# Patient Record
Sex: FEMALE | Race: BLACK OR AFRICAN AMERICAN | Employment: OTHER | URBAN - METROPOLITAN AREA
[De-identification: names, ages, dates, MRNs, and addresses within clinical notes are randomized per-mention and may not be internally consistent; named-entity substitution may affect disease eponyms.]

---

## 2017-10-23 ENCOUNTER — APPOINTMENT (OUTPATIENT)
Dept: CT IMAGING | Age: 82
End: 2017-10-23
Attending: EMERGENCY MEDICINE
Payer: MEDICARE

## 2017-10-23 ENCOUNTER — HOSPITAL ENCOUNTER (EMERGENCY)
Age: 82
Discharge: HOME | End: 2017-10-23
Attending: EMERGENCY MEDICINE
Payer: MEDICARE

## 2017-10-23 VITALS
HEIGHT: 66 IN | DIASTOLIC BLOOD PRESSURE: 81 MMHG | RESPIRATION RATE: 16 BRPM | HEART RATE: 74 BPM | TEMPERATURE: 98 F | WEIGHT: 178 LBS | SYSTOLIC BLOOD PRESSURE: 168 MMHG | OXYGEN SATURATION: 98 % | BODY MASS INDEX: 28.61 KG/M2

## 2017-10-23 DIAGNOSIS — G89.29 CHRONIC NONINTRACTABLE HEADACHE, UNSPECIFIED HEADACHE TYPE: Primary | ICD-10-CM

## 2017-10-23 DIAGNOSIS — R51.9 CHRONIC NONINTRACTABLE HEADACHE, UNSPECIFIED HEADACHE TYPE: Primary | ICD-10-CM

## 2017-10-23 LAB
ANION GAP SERPL CALC-SCNC: 10 MMOL/L (ref 7–16)
BUN SERPL-MCNC: 13 MG/DL (ref 8–23)
CALCIUM SERPL-MCNC: 9 MG/DL (ref 8.3–10.4)
CHLORIDE SERPL-SCNC: 104 MMOL/L (ref 98–107)
CO2 SERPL-SCNC: 27 MMOL/L (ref 21–32)
CREAT SERPL-MCNC: 0.78 MG/DL (ref 0.6–1)
GLUCOSE SERPL-MCNC: 147 MG/DL (ref 65–100)
POTASSIUM SERPL-SCNC: 3.7 MMOL/L (ref 3.5–5.1)
SODIUM SERPL-SCNC: 141 MMOL/L (ref 136–145)
TROPONIN I BLD-MCNC: 0 NG/ML (ref 0.02–0.05)

## 2017-10-23 PROCEDURE — 99285 EMERGENCY DEPT VISIT HI MDM: CPT | Performed by: EMERGENCY MEDICINE

## 2017-10-23 PROCEDURE — 70450 CT HEAD/BRAIN W/O DYE: CPT

## 2017-10-23 PROCEDURE — 84484 ASSAY OF TROPONIN QUANT: CPT

## 2017-10-23 PROCEDURE — 80048 BASIC METABOLIC PNL TOTAL CA: CPT | Performed by: EMERGENCY MEDICINE

## 2017-10-23 PROCEDURE — 74011250637 HC RX REV CODE- 250/637: Performed by: EMERGENCY MEDICINE

## 2017-10-23 PROCEDURE — 93005 ELECTROCARDIOGRAM TRACING: CPT | Performed by: EMERGENCY MEDICINE

## 2017-10-23 RX ORDER — BUTALBITAL, ACETAMINOPHEN AND CAFFEINE 50; 325; 40 MG/1; MG/1; MG/1
1 TABLET ORAL
Status: COMPLETED | OUTPATIENT
Start: 2017-10-23 | End: 2017-10-23

## 2017-10-23 RX ORDER — ATORVASTATIN CALCIUM 40 MG/1
40 TABLET, FILM COATED ORAL DAILY
Qty: 30 TAB | Refills: 0 | Status: SHIPPED | OUTPATIENT
Start: 2017-10-23

## 2017-10-23 RX ORDER — BUTALBITAL, ACETAMINOPHEN AND CAFFEINE 300; 40; 50 MG/1; MG/1; MG/1
1 CAPSULE ORAL
Qty: 15 CAP | Refills: 0 | Status: SHIPPED | OUTPATIENT
Start: 2017-10-23 | End: 2017-10-27

## 2017-10-23 RX ORDER — PHENAZOPYRIDINE HYDROCHLORIDE 200 MG/1
200 TABLET, FILM COATED ORAL 3 TIMES DAILY
Qty: 6 TAB | Refills: 0 | Status: SHIPPED | OUTPATIENT
Start: 2017-10-23 | End: 2017-10-25

## 2017-10-23 RX ORDER — ATORVASTATIN CALCIUM 40 MG/1
40 TABLET, FILM COATED ORAL DAILY
COMMUNITY
End: 2017-10-27

## 2017-10-23 RX ADMIN — BUTALBITAL, ACETAMINOPHEN AND CAFFEINE 1 TABLET: 50; 325; 40 TABLET ORAL at 15:27

## 2017-10-23 NOTE — ED NOTES
Patient resting in bed, lying supine. Patient denies any needs at this time. Patient c/o \"small headache\".

## 2017-10-23 NOTE — ED PROVIDER NOTES
HPI:  80 F, here with headache, dizziness and pain with urination. Mariel migraine. Stated this is typical for migraine headaches. She took some Advil at home. Typically takes Excedrin for her headache but does not have any at home. Bilateral frontal.  There is similar to the past.  She's also had some vague dizziness which she has also had in the past. Has been going on for The past 24 hours. She denies any vision changes. No vision loss. She has not had any weakness tingling, numbness. 2 days of pain with urination and some discomfort in the suprapubic region. Denies any abnormal vaginal bleeding or discharge. ROS  Constitutional: No fever, no chills  Skin: no rash  Eye: No vision changes  ENMT: No sore throat, no congestion  Respiratory: No shortness of breath, no cough  Cardiovascular: No chest pain, no palpitations  Gastrointestinal: No vomiting, no nausea, no diarrhea, no constipation, no rectal bleeding, no abdominal pain  : No dysuria, no hematuria  MSK: No back pain, no muscle pain, no joint pain  Neuro: No headache, no change in mental status, no numbness, no tingling, no weakness  Psych: No anxiety, no depression  Endocrine: No hyperglycemia  All other review of systems positive per history of present illness and the above otherwise negative or noncontributory. Visit Vitals    BP (!) 220/84 (BP 1 Location: Left arm, BP Patient Position: At rest)    Pulse 80    Temp 98.1 °F (36.7 °C)    Resp 18    Ht 5' 6\" (1.676 m)    Wt 80.7 kg (178 lb)    SpO2 97%    BMI 28.73 kg/m2     Past Medical History:   Diagnosis Date    Asthma     Diabetes ([facility] [address]     Hypertension     Ill-defined condition     blockage in carotid artery    Psychiatric disorder     depression     Past Surgical History:   Procedure Laterality Date    HX GYN      D & C     Prior to Admission Medications   Prescriptions Last Dose Informant Patient Reported? Taking?    Liraglutide (VICTOZA) 0.6 mg/0.1 mL (18 mg/3 mL) sub-q pen   Yes No   Si.6 mg by SubCUTAneous route. amLODIPine (NORVASC) 10 mg tablet   Yes No   Sig: Take 10 mg by mouth daily. aspirin delayed-release 81 mg tablet   Yes No   Sig: Take 81 mg by mouth daily. brimonidine-timolol (COMBIGAN) 0.2-0.5 % drop ophthalmic solution   Yes No   Sig: Administer 1 Drop to both eyes every twelve (12) hours. brinzolamide (AZOPT) 1 % ophthalmic suspension   Yes No   Sig: Administer 1 Drop to both eyes three (3) times daily. butalbital-acetaminophen-caffeine (FIORICET) -40 mg per tablet   No No   Sig: Take 1 Tab by mouth every four (4) hours as needed for Headache for up to 20 doses. Max Daily Amount: 6 Tabs. Maximum dose 6 capsules daily   cinnamon bark 500 mg cap   Yes No   Sig: Take 1,000 mg by mouth.   cloNIDine (CATAPRES) 0.1 mg/24 hr patch   Yes No   Si Patch by TransDERmal route every seven (7) days. escitalopram oxalate (LEXAPRO) 5 mg tablet   Yes No   Sig: Take  by mouth daily. indapamide (LOZOL) 1.25 mg tablet   Yes No   Sig: Take  by mouth daily. insulin glargine (LANTUS SOLOSTAR) 100 unit/mL (3 mL) pen   Yes No   Sig: by SubCUTAneous route. metFORMIN (GLUCOPHAGE) 1,000 mg tablet   Yes No   Sig: Take 1,000 mg by mouth two (2) times daily (with meals). omeprazole (PRILOSEC) 20 mg capsule   Yes No   Sig: Take 20 mg by mouth daily. pilocarpine (PILOCAR) 4 % ophthalmic solution   Yes No   Sig: Administer 1 Drop to both eyes four (4) times daily. simvastatin (ZOCOR) 40 mg tablet   Yes No   Sig: Take 40 mg by mouth nightly. valsartan (DIOVAN) 320 mg tablet   Yes No   Sig: Take 320 mg by mouth daily.       Facility-Administered Medications: None         Adult Exam   General: alert, no acute distress  Head: normocephalic, atraumatic  ENT: moist mucous membranes  Neck: supple, non-tender; full range of motion  Cardiovascular: regular rate and rhythm, normal peripheral perfusion, no edema  Respiratory: lungs are clear to auscultation; normal respirations; no wheezing, rales or rhonchi  Gastrointestinal: soft, non-tender; no rebound or guarding, no peritoneal signs, no distension  Back: non-tender, full range of motion  Musculoskeletal: normal range of motion, normal strength, no gross deformities  Neurological: alert and oriented x 4, no gross focal deficits; normal speech. No pronator drift. Normal ambulation. No ataxia   Psychiatric: cooperative; appropriate mood and affect    MDM: she obtained and interpreted by me rate of 58 sinus rhythm normal axis. Normal interval.  There is no GLORIA that would be consistent with acute STEMI. Nonspecific T-wave changes noted laterally  NO old EKG for comparison. She is from [place]    Trop negative   Ct head negative. No hemorrhage. Low susp for dissection, acs, pe at this time. UA negative for acute UTI. Possible cystitis with pain on urination. Will give pyridium for home. Normal kidney function. Fioricet given for headache. Well appearing. Neuro intact. No deficits. Low susp for CVA/TIA. Do not feel admission for MRI is needed. Stable for discharge. Patient started she typically has elevated BP when she is at the doctor. She asked for a refill of Atorvastatin 40 mg daily. WIll give her prescription for 30 days     Ct Head Wo Cont    Result Date: 10/23/2017  Noncontrast head CT Clinical Indication: Acute generalized headache and dizziness with history of hypertension, diabetes, depression, carotid artery stenosis. Technique: Noncontrast axial images were obtained through the brain. Comparison: None available Findings: There is no acute intracranial hemorrhage, hydrocephalus, intra-axial mass, or mass-effect. There are moderate scattered and confluent areas of nonspecific low attenuation involving bilateral periventricular white matter and to a lesser extent basal ganglia and centrum semiovale. Superimposed small or developing infarct cannot be excluded by CT.  There is no CT evidence of acute large artery territorial infarction or abnormal extra-axial fluid collection. The mastoid air cells and paranasal sinuses are clear where imaged. No displaced skull fractures are present. Impression: 1. No evidence of hemorrhage. 2. White matter hypodensities are nonspecific, most often chronic small vessel ischemia. Recent Results (from the past 8 hour(s))   EKG, 12 LEAD, INITIAL    Collection Time: 10/23/17  2:26 PM   Result Value Ref Range    Ventricular Rate 58 BPM    Atrial Rate 58 BPM    P-R Interval 192 ms    QRS Duration 92 ms    Q-T Interval 444 ms    QTC Calculation (Bezet) 435 ms    Calculated P Axis 74 degrees    Calculated R Axis -11 degrees    Calculated T Axis 115 degrees    Diagnosis       !! AGE AND GENDER SPECIFIC ECG ANALYSIS !! Sinus bradycardia  Left ventricular hypertrophy with repolarization abnormality  Cannot rule out Septal infarct , age undetermined  Abnormal ECG  When compared with ECG of 17-NOV-2014 22:11,  T wave inversion now evident in Lateral leads     METABOLIC PANEL, BASIC    Collection Time: 10/23/17  2:51 PM   Result Value Ref Range    Sodium 141 136 - 145 mmol/L    Potassium 3.7 3.5 - 5.1 mmol/L    Chloride 104 98 - 107 mmol/L    CO2 27 21 - 32 mmol/L    Anion gap 10 7 - 16 mmol/L    Glucose 147 (H) 65 - 100 mg/dL    BUN 13 8 - 23 MG/DL    Creatinine 0.78 0.6 - 1.0 MG/DL    GFR est AA >60 >60 ml/min/1.73m2    GFR est non-AA >60 >60 ml/min/1.73m2    Calcium 9.0 8.3 - 10.4 MG/DL   POC TROPONIN-I    Collection Time: 10/23/17  2:54 PM   Result Value Ref Range    Troponin-I (POC) 0 (L) 0.02 - 0.05 ng/ml         Dragon voice recognition software was used to create this note. Although the note has been reviewed and corrected where necessary, additional errors may have been overlooked and remain in the text.

## 2017-10-23 NOTE — ED NOTES
Patient ambulatory to the restroom with steady gait. Patient denies dizziness or lightheadedness while walking.

## 2017-10-23 NOTE — ED TRIAGE NOTES
Pt to er c/o headache and feeling dizzy for 24 hrs. .. sts she has high b/p and took her high b/p medicine today. .. Pt c/o pain when she urinates. ... Pt speech clear. .. Equil . .. No leg weakness, no arm weakness. .. symmetrical face in triage

## 2017-10-23 NOTE — DISCHARGE INSTRUCTIONS

## 2017-10-24 LAB
ATRIAL RATE: 58 BPM
CALCULATED P AXIS, ECG09: 74 DEGREES
CALCULATED R AXIS, ECG10: -11 DEGREES
CALCULATED T AXIS, ECG11: 115 DEGREES
DIAGNOSIS, 93000: NORMAL
P-R INTERVAL, ECG05: 192 MS
Q-T INTERVAL, ECG07: 444 MS
QRS DURATION, ECG06: 92 MS
QTC CALCULATION (BEZET), ECG08: 435 MS
VENTRICULAR RATE, ECG03: 58 BPM

## 2019-01-20 ENCOUNTER — HOSPITAL ENCOUNTER (EMERGENCY)
Age: 84
Discharge: ANOTHER HOSPITAL | End: 2019-01-21
Attending: EMERGENCY MEDICINE
Payer: MEDICARE

## 2019-01-20 ENCOUNTER — APPOINTMENT (OUTPATIENT)
Dept: GENERAL RADIOLOGY | Age: 84
End: 2019-01-20
Attending: EMERGENCY MEDICINE
Payer: MEDICARE

## 2019-01-20 DIAGNOSIS — I20.0 UNSTABLE ANGINA PECTORIS (HCC): Primary | ICD-10-CM

## 2019-01-20 LAB
ALBUMIN SERPL-MCNC: 3.4 G/DL (ref 3.2–4.6)
ALBUMIN/GLOB SERPL: 0.9 {RATIO}
ALP SERPL-CCNC: 74 U/L (ref 50–130)
ALT SERPL-CCNC: 27 U/L (ref 12–65)
ANION GAP SERPL CALC-SCNC: 8 MMOL/L
AST SERPL-CCNC: 20 U/L (ref 15–37)
BASOPHILS # BLD: 0.1 K/UL (ref 0–0.2)
BASOPHILS NFR BLD: 1 % (ref 0–2)
BILIRUB SERPL-MCNC: 0.1 MG/DL (ref 0.2–1.1)
BUN SERPL-MCNC: 17 MG/DL (ref 8–23)
CALCIUM SERPL-MCNC: 8.7 MG/DL (ref 8.3–10.4)
CHLORIDE SERPL-SCNC: 107 MMOL/L (ref 98–107)
CO2 SERPL-SCNC: 26 MMOL/L (ref 21–32)
CREAT SERPL-MCNC: 1.14 MG/DL (ref 0.6–1)
DIFFERENTIAL METHOD BLD: ABNORMAL
EOSINOPHIL # BLD: 0.1 K/UL (ref 0–0.8)
EOSINOPHIL NFR BLD: 1 % (ref 0.5–7.8)
ERYTHROCYTE [DISTWIDTH] IN BLOOD BY AUTOMATED COUNT: 14.3 % (ref 11.9–14.6)
GLOBULIN SER CALC-MCNC: 3.9 G/DL (ref 2.3–3.5)
GLUCOSE SERPL-MCNC: 205 MG/DL (ref 65–100)
HCT VFR BLD AUTO: 29 % (ref 35.8–46.3)
HGB BLD-MCNC: 9 G/DL (ref 11.7–15.4)
IMM GRANULOCYTES # BLD AUTO: 0 K/UL (ref 0–0.5)
IMM GRANULOCYTES NFR BLD AUTO: 0 % (ref 0–5)
LYMPHOCYTES # BLD: 2.7 K/UL (ref 0.5–4.6)
LYMPHOCYTES NFR BLD: 35 % (ref 13–44)
MCH RBC QN AUTO: 25.7 PG (ref 26.1–32.9)
MCHC RBC AUTO-ENTMCNC: 31 G/DL (ref 31.4–35)
MCV RBC AUTO: 82.9 FL (ref 79.6–97.8)
MONOCYTES # BLD: 0.7 K/UL (ref 0.1–1.3)
MONOCYTES NFR BLD: 9 % (ref 4–12)
NEUTS SEG # BLD: 4.2 K/UL (ref 1.7–8.2)
NEUTS SEG NFR BLD: 55 % (ref 43–78)
NRBC # BLD: 0 K/UL (ref 0–0.2)
PLATELET # BLD AUTO: 192 K/UL (ref 150–450)
PMV BLD AUTO: 12.2 FL (ref 9.4–12.3)
POTASSIUM SERPL-SCNC: 4.4 MMOL/L (ref 3.5–5.1)
PROT SERPL-MCNC: 7.3 G/DL
RBC # BLD AUTO: 3.5 M/UL (ref 4.05–5.2)
SODIUM SERPL-SCNC: 141 MMOL/L (ref 136–145)
TROPONIN I BLD-MCNC: 0 NG/ML (ref 0.02–0.05)
WBC # BLD AUTO: 7.8 K/UL (ref 4.3–11.1)

## 2019-01-20 PROCEDURE — 85025 COMPLETE CBC W/AUTO DIFF WBC: CPT

## 2019-01-20 PROCEDURE — 84484 ASSAY OF TROPONIN QUANT: CPT

## 2019-01-20 PROCEDURE — 93005 ELECTROCARDIOGRAM TRACING: CPT | Performed by: EMERGENCY MEDICINE

## 2019-01-20 PROCEDURE — 71045 X-RAY EXAM CHEST 1 VIEW: CPT

## 2019-01-20 PROCEDURE — 80053 COMPREHEN METABOLIC PANEL: CPT

## 2019-01-20 PROCEDURE — 99285 EMERGENCY DEPT VISIT HI MDM: CPT | Performed by: EMERGENCY MEDICINE

## 2019-01-20 RX ORDER — METOPROLOL TARTRATE 5 MG/5ML
5 INJECTION INTRAVENOUS ONCE
Status: CANCELLED | OUTPATIENT
Start: 2019-01-20 | End: 2019-01-20

## 2019-01-20 RX ORDER — HEPARIN SODIUM 5000 [USP'U]/100ML
12-25 INJECTION, SOLUTION INTRAVENOUS
Status: CANCELLED | OUTPATIENT
Start: 2019-01-20

## 2019-01-20 RX ORDER — GUAIFENESIN 100 MG/5ML
162 LIQUID (ML) ORAL
Status: CANCELLED | OUTPATIENT
Start: 2019-01-20 | End: 2019-01-20

## 2019-01-20 RX ORDER — HEPARIN SODIUM 5000 [USP'U]/ML
4000 INJECTION, SOLUTION INTRAVENOUS; SUBCUTANEOUS
Status: CANCELLED | OUTPATIENT
Start: 2019-01-20 | End: 2019-01-20

## 2019-01-21 ENCOUNTER — HOSPITAL ENCOUNTER (OUTPATIENT)
Age: 84
Setting detail: OBSERVATION
Discharge: HOME | End: 2019-01-21
Attending: INTERNAL MEDICINE | Admitting: INTERNAL MEDICINE
Payer: MEDICARE

## 2019-01-21 VITALS
HEART RATE: 53 BPM | DIASTOLIC BLOOD PRESSURE: 70 MMHG | BODY MASS INDEX: 28.21 KG/M2 | HEIGHT: 66 IN | TEMPERATURE: 98.4 F | SYSTOLIC BLOOD PRESSURE: 159 MMHG | WEIGHT: 175.5 LBS | OXYGEN SATURATION: 98 % | RESPIRATION RATE: 17 BRPM

## 2019-01-21 VITALS
WEIGHT: 170 LBS | RESPIRATION RATE: 17 BRPM | HEIGHT: 66 IN | TEMPERATURE: 98 F | HEART RATE: 63 BPM | BODY MASS INDEX: 27.32 KG/M2 | DIASTOLIC BLOOD PRESSURE: 74 MMHG | OXYGEN SATURATION: 99 % | SYSTOLIC BLOOD PRESSURE: 159 MMHG

## 2019-01-21 PROBLEM — R69 ILL-DEFINED CONDITION: Chronic | Status: ACTIVE | Noted: 2019-01-21

## 2019-01-21 PROBLEM — R69 ILL-DEFINED CONDITION: Status: ACTIVE | Noted: 2019-01-21

## 2019-01-21 PROBLEM — K21.9 GERD (GASTROESOPHAGEAL REFLUX DISEASE): Chronic | Status: ACTIVE | Noted: 2019-01-21

## 2019-01-21 PROBLEM — R07.9 CHEST PAIN: Status: ACTIVE | Noted: 2019-01-21

## 2019-01-21 PROBLEM — I10 HYPERTENSION: Chronic | Status: ACTIVE | Noted: 2019-01-21

## 2019-01-21 PROBLEM — E78.5 HLD (HYPERLIPIDEMIA): Chronic | Status: ACTIVE | Noted: 2019-01-21

## 2019-01-21 PROBLEM — E11.9 DIABETES (HCC): Chronic | Status: ACTIVE | Noted: 2019-01-21

## 2019-01-21 LAB
ANION GAP SERPL CALC-SCNC: 8 MMOL/L (ref 7–16)
ATRIAL RATE: 74 BPM
BUN SERPL-MCNC: 16 MG/DL (ref 8–23)
CALCIUM SERPL-MCNC: 8.3 MG/DL (ref 8.3–10.4)
CALCULATED P AXIS, ECG09: 84 DEGREES
CALCULATED R AXIS, ECG10: 23 DEGREES
CALCULATED T AXIS, ECG11: 103 DEGREES
CHLORIDE SERPL-SCNC: 108 MMOL/L (ref 98–107)
CHOLEST SERPL-MCNC: 132 MG/DL
CO2 SERPL-SCNC: 26 MMOL/L (ref 21–32)
CREAT SERPL-MCNC: 0.89 MG/DL (ref 0.6–1)
DIAGNOSIS, 93000: NORMAL
ERYTHROCYTE [DISTWIDTH] IN BLOOD BY AUTOMATED COUNT: 14.3 % (ref 11.9–14.6)
GLUCOSE BLD STRIP.AUTO-MCNC: 122 MG/DL (ref 65–100)
GLUCOSE BLD STRIP.AUTO-MCNC: 124 MG/DL (ref 65–100)
GLUCOSE BLD STRIP.AUTO-MCNC: 94 MG/DL (ref 65–100)
GLUCOSE SERPL-MCNC: 106 MG/DL (ref 65–100)
HCT VFR BLD AUTO: 28.4 % (ref 35.8–46.3)
HDLC SERPL-MCNC: 74 MG/DL (ref 40–60)
HDLC SERPL: 1.8 {RATIO}
HGB BLD-MCNC: 8.6 G/DL (ref 11.7–15.4)
LDLC SERPL CALC-MCNC: 48.4 MG/DL
LIPID PROFILE,FLP: ABNORMAL
MAGNESIUM SERPL-MCNC: 1.6 MG/DL (ref 1.8–2.4)
MCH RBC QN AUTO: 25.3 PG (ref 26.1–32.9)
MCHC RBC AUTO-ENTMCNC: 30.3 G/DL (ref 31.4–35)
MCV RBC AUTO: 83.5 FL (ref 79.6–97.8)
NRBC # BLD: 0 K/UL (ref 0–0.2)
P-R INTERVAL, ECG05: 224 MS
PLATELET # BLD AUTO: 177 K/UL (ref 150–450)
PMV BLD AUTO: 12.8 FL (ref 9.4–12.3)
POTASSIUM SERPL-SCNC: 3.7 MMOL/L (ref 3.5–5.1)
Q-T INTERVAL, ECG07: 372 MS
QRS DURATION, ECG06: 82 MS
QTC CALCULATION (BEZET), ECG08: 412 MS
RBC # BLD AUTO: 3.4 M/UL (ref 4.05–5.2)
SODIUM SERPL-SCNC: 142 MMOL/L (ref 136–145)
TRIGL SERPL-MCNC: 48 MG/DL (ref 35–150)
TROPONIN I SERPL-MCNC: 0.02 NG/ML (ref 0.02–0.05)
TSH SERPL DL<=0.005 MIU/L-ACNC: 0.84 UIU/ML (ref 0.36–3.74)
VENTRICULAR RATE, ECG03: 74 BPM
VLDLC SERPL CALC-MCNC: 9.6 MG/DL (ref 6–23)
WBC # BLD AUTO: 7.3 K/UL (ref 4.3–11.1)

## 2019-01-21 PROCEDURE — 74011250636 HC RX REV CODE- 250/636

## 2019-01-21 PROCEDURE — 84443 ASSAY THYROID STIM HORMONE: CPT

## 2019-01-21 PROCEDURE — 80061 LIPID PANEL: CPT

## 2019-01-21 PROCEDURE — 93306 TTE W/DOPPLER COMPLETE: CPT

## 2019-01-21 PROCEDURE — 93458 L HRT ARTERY/VENTRICLE ANGIO: CPT

## 2019-01-21 PROCEDURE — 99152 MOD SED SAME PHYS/QHP 5/>YRS: CPT

## 2019-01-21 PROCEDURE — 74011250636 HC RX REV CODE- 250/636: Performed by: INTERNAL MEDICINE

## 2019-01-21 PROCEDURE — 74011250636 HC RX REV CODE- 250/636: Performed by: NURSE PRACTITIONER

## 2019-01-21 PROCEDURE — 74011250637 HC RX REV CODE- 250/637: Performed by: PHYSICIAN ASSISTANT

## 2019-01-21 PROCEDURE — 36415 COLL VENOUS BLD VENIPUNCTURE: CPT

## 2019-01-21 PROCEDURE — 84484 ASSAY OF TROPONIN QUANT: CPT

## 2019-01-21 PROCEDURE — 74011000250 HC RX REV CODE- 250: Performed by: NURSE PRACTITIONER

## 2019-01-21 PROCEDURE — 83735 ASSAY OF MAGNESIUM: CPT

## 2019-01-21 PROCEDURE — 99218 HC RM OBSERVATION: CPT

## 2019-01-21 PROCEDURE — 74011636320 HC RX REV CODE- 636/320: Performed by: INTERNAL MEDICINE

## 2019-01-21 PROCEDURE — 85027 COMPLETE CBC AUTOMATED: CPT

## 2019-01-21 PROCEDURE — 74011000250 HC RX REV CODE- 250: Performed by: INTERNAL MEDICINE

## 2019-01-21 PROCEDURE — 96365 THER/PROPH/DIAG IV INF INIT: CPT

## 2019-01-21 PROCEDURE — 96361 HYDRATE IV INFUSION ADD-ON: CPT

## 2019-01-21 PROCEDURE — 82962 GLUCOSE BLOOD TEST: CPT

## 2019-01-21 PROCEDURE — 74011250637 HC RX REV CODE- 250/637: Performed by: NURSE PRACTITIONER

## 2019-01-21 PROCEDURE — 77010033678 HC OXYGEN DAILY

## 2019-01-21 PROCEDURE — 77030020263 HC SOL INJ SOD CL0.9% LFCR 1000ML

## 2019-01-21 PROCEDURE — 80048 BASIC METABOLIC PNL TOTAL CA: CPT

## 2019-01-21 RX ORDER — PILOCARPINE HYDROCHLORIDE 40 MG/ML
1 SOLUTION/ DROPS OPHTHALMIC 4 TIMES DAILY
Status: DISCONTINUED | OUTPATIENT
Start: 2019-01-21 | End: 2019-01-22 | Stop reason: HOSPADM

## 2019-01-21 RX ORDER — NALOXONE HYDROCHLORIDE 0.4 MG/ML
0.4 INJECTION, SOLUTION INTRAMUSCULAR; INTRAVENOUS; SUBCUTANEOUS AS NEEDED
Status: DISCONTINUED | OUTPATIENT
Start: 2019-01-21 | End: 2019-01-22 | Stop reason: HOSPADM

## 2019-01-21 RX ORDER — ONDANSETRON 2 MG/ML
4 INJECTION INTRAMUSCULAR; INTRAVENOUS
Status: DISCONTINUED | OUTPATIENT
Start: 2019-01-21 | End: 2019-01-22 | Stop reason: HOSPADM

## 2019-01-21 RX ORDER — VALSARTAN 320 MG/1
320 TABLET ORAL DAILY
Status: DISCONTINUED | OUTPATIENT
Start: 2019-01-21 | End: 2019-01-22 | Stop reason: HOSPADM

## 2019-01-21 RX ORDER — SODIUM CHLORIDE 0.9 % (FLUSH) 0.9 %
5-40 SYRINGE (ML) INJECTION AS NEEDED
Status: DISCONTINUED | OUTPATIENT
Start: 2019-01-21 | End: 2019-01-22 | Stop reason: HOSPADM

## 2019-01-21 RX ORDER — ACETAMINOPHEN 500 MG
500 TABLET ORAL
Status: DISCONTINUED | OUTPATIENT
Start: 2019-01-21 | End: 2019-01-21 | Stop reason: SDUPTHER

## 2019-01-21 RX ORDER — AMLODIPINE BESYLATE 10 MG/1
10 TABLET ORAL DAILY
Status: DISCONTINUED | OUTPATIENT
Start: 2019-01-21 | End: 2019-01-22 | Stop reason: HOSPADM

## 2019-01-21 RX ORDER — TIMOLOL MALEATE 5 MG/ML
1 SOLUTION/ DROPS OPHTHALMIC 2 TIMES DAILY
Status: DISCONTINUED | OUTPATIENT
Start: 2019-01-21 | End: 2019-01-22 | Stop reason: HOSPADM

## 2019-01-21 RX ORDER — INSULIN GLARGINE 100 [IU]/ML
12 INJECTION, SOLUTION SUBCUTANEOUS
Status: DISCONTINUED | OUTPATIENT
Start: 2019-01-21 | End: 2019-01-22 | Stop reason: HOSPADM

## 2019-01-21 RX ORDER — MIDAZOLAM HYDROCHLORIDE 1 MG/ML
.5-2 INJECTION, SOLUTION INTRAMUSCULAR; INTRAVENOUS
Status: DISCONTINUED | OUTPATIENT
Start: 2019-01-21 | End: 2019-01-22 | Stop reason: HOSPADM

## 2019-01-21 RX ORDER — METOPROLOL SUCCINATE 25 MG/1
25 TABLET, EXTENDED RELEASE ORAL DAILY
Status: DISCONTINUED | OUTPATIENT
Start: 2019-01-21 | End: 2019-01-22 | Stop reason: HOSPADM

## 2019-01-21 RX ORDER — INSULIN LISPRO 100 [IU]/ML
0-10 INJECTION, SOLUTION INTRAVENOUS; SUBCUTANEOUS
Status: DISCONTINUED | OUTPATIENT
Start: 2019-01-21 | End: 2019-01-22 | Stop reason: HOSPADM

## 2019-01-21 RX ORDER — ACETAMINOPHEN 325 MG/1
650 TABLET ORAL
Status: DISCONTINUED | OUTPATIENT
Start: 2019-01-21 | End: 2019-01-22 | Stop reason: HOSPADM

## 2019-01-21 RX ORDER — DORZOLAMIDE HCL 20 MG/ML
1 SOLUTION/ DROPS OPHTHALMIC 3 TIMES DAILY
Status: DISCONTINUED | OUTPATIENT
Start: 2019-01-21 | End: 2019-01-21 | Stop reason: SDUPTHER

## 2019-01-21 RX ORDER — ASPIRIN 81 MG/1
81 TABLET ORAL DAILY
Status: DISCONTINUED | OUTPATIENT
Start: 2019-01-21 | End: 2019-01-22 | Stop reason: HOSPADM

## 2019-01-21 RX ORDER — DORZOLAMIDE HCL 20 MG/ML
1 SOLUTION/ DROPS OPHTHALMIC 3 TIMES DAILY
Status: DISCONTINUED | OUTPATIENT
Start: 2019-01-21 | End: 2019-01-22 | Stop reason: HOSPADM

## 2019-01-21 RX ORDER — PANTOPRAZOLE SODIUM 40 MG/1
40 TABLET, DELAYED RELEASE ORAL
Status: DISCONTINUED | OUTPATIENT
Start: 2019-01-21 | End: 2019-01-22 | Stop reason: HOSPADM

## 2019-01-21 RX ORDER — SODIUM CHLORIDE 0.9 % (FLUSH) 0.9 %
5-40 SYRINGE (ML) INJECTION EVERY 8 HOURS
Status: DISCONTINUED | OUTPATIENT
Start: 2019-01-21 | End: 2019-01-22 | Stop reason: HOSPADM

## 2019-01-21 RX ORDER — SODIUM CHLORIDE 9 MG/ML
75 INJECTION, SOLUTION INTRAVENOUS CONTINUOUS
Status: DISCONTINUED | OUTPATIENT
Start: 2019-01-21 | End: 2019-01-22 | Stop reason: HOSPADM

## 2019-01-21 RX ORDER — TIMOLOL MALEATE 5 MG/ML
1 SOLUTION/ DROPS OPHTHALMIC EVERY 12 HOURS
Status: DISCONTINUED | OUTPATIENT
Start: 2019-01-21 | End: 2019-01-21

## 2019-01-21 RX ORDER — FENTANYL CITRATE 50 UG/ML
25-100 INJECTION, SOLUTION INTRAMUSCULAR; INTRAVENOUS
Status: DISCONTINUED | OUTPATIENT
Start: 2019-01-21 | End: 2019-01-22 | Stop reason: HOSPADM

## 2019-01-21 RX ORDER — LATANOPROST 50 UG/ML
1 SOLUTION/ DROPS OPHTHALMIC
Status: DISCONTINUED | OUTPATIENT
Start: 2019-01-21 | End: 2019-01-22 | Stop reason: HOSPADM

## 2019-01-21 RX ORDER — LATANOPROST 50 UG/ML
1 SOLUTION/ DROPS OPHTHALMIC
Status: DISCONTINUED | OUTPATIENT
Start: 2019-01-21 | End: 2019-01-21

## 2019-01-21 RX ORDER — MAGNESIUM SULFATE HEPTAHYDRATE 40 MG/ML
2 INJECTION, SOLUTION INTRAVENOUS ONCE
Status: COMPLETED | OUTPATIENT
Start: 2019-01-21 | End: 2019-01-21

## 2019-01-21 RX ORDER — BRIMONIDINE TARTRATE 2 MG/ML
1 SOLUTION/ DROPS OPHTHALMIC EVERY 12 HOURS
Status: DISCONTINUED | OUTPATIENT
Start: 2019-01-21 | End: 2019-01-21

## 2019-01-21 RX ORDER — BRIMONIDINE TARTRATE 2 MG/ML
1 SOLUTION/ DROPS OPHTHALMIC EVERY 12 HOURS
Status: DISCONTINUED | OUTPATIENT
Start: 2019-01-21 | End: 2019-01-22 | Stop reason: HOSPADM

## 2019-01-21 RX ORDER — HYDROCODONE BITARTRATE AND ACETAMINOPHEN 5; 325 MG/1; MG/1
1 TABLET ORAL
Status: DISCONTINUED | OUTPATIENT
Start: 2019-01-21 | End: 2019-01-22 | Stop reason: HOSPADM

## 2019-01-21 RX ORDER — GUAIFENESIN 100 MG/5ML
243 LIQUID (ML) ORAL DAILY
Status: COMPLETED | OUTPATIENT
Start: 2019-01-21 | End: 2019-01-21

## 2019-01-21 RX ORDER — PILOCARPINE HYDROCHLORIDE 40 MG/ML
1 SOLUTION/ DROPS OPHTHALMIC 4 TIMES DAILY
Status: DISCONTINUED | OUTPATIENT
Start: 2019-01-21 | End: 2019-01-21

## 2019-01-21 RX ORDER — HEPARIN SODIUM 200 [USP'U]/100ML
2 INJECTION, SOLUTION INTRAVENOUS CONTINUOUS
Status: ACTIVE | OUTPATIENT
Start: 2019-01-21 | End: 2019-01-21

## 2019-01-21 RX ORDER — ATORVASTATIN CALCIUM 40 MG/1
40 TABLET, FILM COATED ORAL DAILY
Status: DISCONTINUED | OUTPATIENT
Start: 2019-01-21 | End: 2019-01-22 | Stop reason: HOSPADM

## 2019-01-21 RX ORDER — LIDOCAINE HYDROCHLORIDE 10 MG/ML
2-10 INJECTION INFILTRATION; PERINEURAL
Status: DISCONTINUED | OUTPATIENT
Start: 2019-01-21 | End: 2019-01-22 | Stop reason: HOSPADM

## 2019-01-21 RX ORDER — MORPHINE SULFATE 2 MG/ML
2 INJECTION, SOLUTION INTRAMUSCULAR; INTRAVENOUS
Status: DISCONTINUED | OUTPATIENT
Start: 2019-01-21 | End: 2019-01-21 | Stop reason: SDUPTHER

## 2019-01-21 RX ORDER — NITROGLYCERIN 0.4 MG/1
0.4 TABLET SUBLINGUAL
Status: DISCONTINUED | OUTPATIENT
Start: 2019-01-21 | End: 2019-01-22 | Stop reason: HOSPADM

## 2019-01-21 RX ORDER — MORPHINE SULFATE 2 MG/ML
1 INJECTION, SOLUTION INTRAMUSCULAR; INTRAVENOUS
Status: DISCONTINUED | OUTPATIENT
Start: 2019-01-21 | End: 2019-01-22 | Stop reason: HOSPADM

## 2019-01-21 RX ADMIN — MAGNESIUM SULFATE HEPTAHYDRATE 2 G: 40 INJECTION, SOLUTION INTRAVENOUS at 11:33

## 2019-01-21 RX ADMIN — HEPARIN SODIUM 2 ML/HR: 5000 INJECTION, SOLUTION INTRAVENOUS; SUBCUTANEOUS at 15:15

## 2019-01-21 RX ADMIN — HEPARIN SODIUM 2 ML: 10000 INJECTION, SOLUTION INTRAVENOUS; SUBCUTANEOUS at 15:22

## 2019-01-21 RX ADMIN — SODIUM CHLORIDE 75 ML/HR: 900 INJECTION, SOLUTION INTRAVENOUS at 18:00

## 2019-01-21 RX ADMIN — AMLODIPINE BESYLATE 10 MG: 10 TABLET ORAL at 09:05

## 2019-01-21 RX ADMIN — Medication 5 ML: at 16:54

## 2019-01-21 RX ADMIN — IOPAMIDOL 70 ML: 755 INJECTION, SOLUTION INTRAVENOUS at 15:32

## 2019-01-21 RX ADMIN — TIMOLOL MALEATE 1 DROP: 5 SOLUTION OPHTHALMIC at 10:15

## 2019-01-21 RX ADMIN — BRIMONIDINE TARTRATE 1 DROP: 2 SOLUTION OPHTHALMIC at 10:14

## 2019-01-21 RX ADMIN — Medication 10 ML: at 06:03

## 2019-01-21 RX ADMIN — SODIUM CHLORIDE 75 ML/HR: 900 INJECTION, SOLUTION INTRAVENOUS at 02:13

## 2019-01-21 RX ADMIN — ASPIRIN 81 MG: 81 TABLET, COATED ORAL at 09:04

## 2019-01-21 RX ADMIN — VALSARTAN 320 MG: 320 TABLET, FILM COATED ORAL at 09:04

## 2019-01-21 RX ADMIN — Medication 5 ML: at 02:17

## 2019-01-21 RX ADMIN — NITROGLYCERIN 1 INCH: 20 OINTMENT TOPICAL at 02:16

## 2019-01-21 RX ADMIN — MIDAZOLAM HYDROCHLORIDE 2 MG: 1 INJECTION, SOLUTION INTRAMUSCULAR; INTRAVENOUS at 15:21

## 2019-01-21 RX ADMIN — ATORVASTATIN CALCIUM 40 MG: 40 TABLET, FILM COATED ORAL at 09:04

## 2019-01-21 RX ADMIN — LIDOCAINE HYDROCHLORIDE 2 ML: 10 INJECTION, SOLUTION INFILTRATION; PERINEURAL at 15:21

## 2019-01-21 RX ADMIN — ASPIRIN 81 MG 243 MG: 81 TABLET ORAL at 10:13

## 2019-01-21 RX ADMIN — DORZOLAMIDE HYDROCHLORIDE 1 DROP: 20 SOLUTION/ DROPS OPHTHALMIC at 10:14

## 2019-01-21 RX ADMIN — FENTANYL CITRATE 50 MCG: 50 INJECTION, SOLUTION INTRAMUSCULAR; INTRAVENOUS at 15:21

## 2019-01-21 RX ADMIN — Medication 5 ML: at 13:36

## 2019-01-21 RX ADMIN — NITROGLYCERIN 1 INCH: 20 OINTMENT TOPICAL at 09:08

## 2019-01-21 RX ADMIN — METOPROLOL SUCCINATE 25 MG: 25 TABLET, EXTENDED RELEASE ORAL at 09:08

## 2019-01-21 RX ADMIN — PANTOPRAZOLE SODIUM 40 MG: 40 TABLET, DELAYED RELEASE ORAL at 09:04

## 2019-01-21 RX ADMIN — NITROGLYCERIN 1 INCH: 20 OINTMENT TOPICAL at 13:34

## 2019-01-21 NOTE — ED PROVIDER NOTES
71-year-old female presents with crescendo chest pain onset about a week ago. Initially went to an urgent care who then sent her to [facility] patient failed a stress test there with a reversible ischemic nuclear scans suggestive of perhaps even three-vessel disease. .  Subsequent lysine and her cardiologist office a day or 2 later and has a heart catheter planned for Thursday of next week, 4 days away. Patient experiencing crescendo chest pain. Last night she had 20 minutes of chest pain requiring 2 nitroglycerin relief. Tonight her chest pressure required 3 nitroglycerin to go away. No dyspnea, no nausea, no radiation of the pain, no  Diaphoresis. No previous cardiac history, however this 71-year-old female is diabetic. Currently under a fair amount of stress with her  in the [facility] slated to return home in 10 days, She also has a 71-year-old grandson living at the house with her, who may also be some source of social stressor for her. Past Medical History:  
Diagnosis Date  Asthma  Diabetes ([facility] [address]  Hypertension  Ill-defined condition   
 blockage in carotid artery  Psychiatric disorder   
 depression Past Surgical History:  
Procedure Laterality Date  HX GYN    
 D & C Family History:  
Problem Relation Age of Onset  Diabetes Mother  Hypertension Mother Social History Socioeconomic History  Marital status: UNKNOWN Spouse name: Not on file  Number of children: Not on file  Years of education: Not on file  Highest education level: Not on file Social Needs  Financial resource strain: Not on file  Food insecurity - worry: Not on file  Food insecurity - inability: Not on file  Transportation needs - medical: Not on file  Transportation needs - non-medical: Not on file Occupational History  Not on file Tobacco Use  Smoking status: Never Smoker  Smokeless tobacco: Never Used Substance and Sexual Activity  Alcohol use: Yes Comment: extremely rare  Drug use: No  
 Sexual activity: Not on file Other Topics Concern  Not on file Social History Narrative  Not on file ALLERGIES: Patient has no known allergies. Review of Systems Constitutional: Negative for chills and fever. HENT: Negative for rhinorrhea and sore throat. Eyes: Negative for discharge and redness. Respiratory: Negative for cough and shortness of breath. Cardiovascular: Positive for chest pain. Negative for palpitations. Gastrointestinal: Negative for abdominal pain, diarrhea, nausea and vomiting. Musculoskeletal: Negative for arthralgias and back pain. Skin: Negative for rash. Neurological: Negative for dizziness and headaches. All other systems reviewed and are negative. Vitals:  
 01/20/19 2129 01/20/19 2131 01/20/19 2135 BP: 193/73  193/73 Pulse:  75 68 Resp:   17 Temp:   98.1 °F (36.7 °C) SpO2:  100% 100% Physical Exam  
Constitutional: She is oriented to person, place, and time. She appears well-developed and well-nourished. HENT:  
Head: Normocephalic and atraumatic. Eyes: Conjunctivae are normal. Pupils are equal, round, and reactive to light. Right eye exhibits no discharge. Left eye exhibits no discharge. No scleral icterus. Neck: Normal range of motion. Neck supple. Cardiovascular: Normal rate and regular rhythm. Exam reveals no gallop. Murmur heard. Pulmonary/Chest: Effort normal and breath sounds normal. No respiratory distress. She has no wheezes. She has no rales. Abdominal: Soft. Bowel sounds are normal. There is no tenderness. There is no guarding. Musculoskeletal: Normal range of motion. She exhibits no edema. Neurological: She is alert and oriented to person, place, and time. She exhibits normal muscle tone. cni 2-12 grossly Skin: Skin is warm and dry. She is not diaphoretic. Psychiatric: She has a normal mood and affect. Her behavior is normal.  
Nursing note and vitals reviewed. MDM Number of Diagnoses or Management Options Unstable angina pectoris [facility]): new and requires workup Diagnosis management comments: Medical decision making note: 
Chest pain crescendo Positive reversible ischemic stress test inferolaterally with a TD I score of about 1.27. Suggesting three-vessel disease Nondiagnostic EKG, initial troponin is negative, Unstable angina, will start on heparin and admitted to cardiology with transfer to the other [address] [place] [address] This concludes the \"medical decision making note\" part of this emergency department visit note. Amount and/or Complexity of Data Reviewed Clinical lab tests: reviewed and ordered (Results Include: 
 
Recent Results (from the past 24 hour(s)) -CBC WITH AUTOMATED DIFF Collection Time: 01/20/19  9:48 PM 
     Result                      Value             Ref Range WBC                         7.8               4.3 - 11.1 K* 
     RBC                         3.50 (L)          4.05 - 5.2 M* HGB                         9.0 (L)           11.7 - 15.4 * HCT                         29.0 (L)          35.8 - 46.3 % MCV                         82.9              79.6 - 97.8 * MCH                         25.7 (L)          26.1 - 32.9 * MCHC                        31.0 (L)          31.4 - 35.0 * RDW                         14.3              11.9 - 14.6 % PLATELET                    192               150 - 450 K/* MPV                         12.2              9.4 - 12.3 FL ABSOLUTE NRBC               0.00              0.0 - 0.2 K/* DF                          AUTOMATED NEUTROPHILS                 55                43 - 78 % LYMPHOCYTES                 35                13 - 44 % MONOCYTES                   9                 4.0 - 12.0 % EOSINOPHILS                 1                 0.5 - 7.8 % BASOPHILS                   1                 0.0 - 2.0 % IMMATURE GRANULOCYTES       0                 0.0 - 5.0 %   
     ABS. NEUTROPHILS            4.2               1.7 - 8.2 K/* ABS. LYMPHOCYTES            2.7               0.5 - 4.6 K/* ABS. MONOCYTES              0.7               0.1 - 1.3 K/* ABS. EOSINOPHILS            0.1               0.0 - 0.8 K/* ABS. BASOPHILS              0.1               0.0 - 0.2 K/* ABS. IMM. GRANS.            0.0               0.0 - 0.5 K/* 
-METABOLIC PANEL, COMPREHENSIVE Collection Time: 01/20/19  9:48 PM 
     Result                      Value             Ref Range Sodium                      141               136 - 145 mm* Potassium                   4.4               3.5 - 5.1 mm* Chloride                    107               98 - 107 mmo* CO2                         26                21 - 32 mmol* Anion gap                   8                 mmol/L Glucose                     205 (H)           65 - 100 mg/* BUN                         17                8 - 23 MG/DL Creatinine                  1.14 (H)          0.6 - 1.0 MG* 
     GFR est AA                  58 (L)            >60 ml/min/1* GFR est non-AA              48                ml/min/1.73m2 Calcium                     8.7               8.3 - 10.4 M* Bilirubin, total            0.1 (L)           0.2 - 1.1 MG* ALT (SGPT)                  27                12 - 65 U/L   
     AST (SGOT)                  20                15 - 37 U/L Alk. phosphatase            74                50 - 130 U/L Protein, total              7.3               g/dL      Albumin                     3.4               3.2 - 4.6 g/* 
 Globulin                    3.9 (H)           2.3 - 3.5 g/* A-G Ratio                   0.9 -POC TROPONIN-I Collection Time: 01/20/19  9:53 PM 
     Result                      Value             Ref Range Troponin-I (POC)            0 (L)             0.02 - 0.05 * 
) Tests in the radiology section of CPT®: reviewed and ordered (XR CHEST PORT Final Result IMPRESSION: 
   
  No evidence of acute pulmonary disease. ) Decide to obtain previous medical records or to obtain history from someone other than the patient: yes (abnormal nuclear perfusion study with inferolateral ischemia and TID of 1.27 with preserved LVEF. DM uncontrolled with A1C 8.2.  ) Risk of Complications, Morbidity, and/or Mortality Presenting problems: high Diagnostic procedures: high Management options: high General comments: 31 minutes were spent in the care, evaluation,, management of this critically ill patient with unstable angina pectoris. She was started on heparin, and transferred to the cardiology unit at [address] [place] [address] Critical Care Total time providing critical care: 30-74 minutes Procedures

## 2019-01-21 NOTE — PROGRESS NOTES
Patient heart rate dropping in the 40's asymptomatic, flipping into a second degree heart block. Spoke with Eugene Anne NP. No new orders. Will continue to monitor.

## 2019-01-21 NOTE — PROGRESS NOTES
TRANSFER - IN REPORT: 
 
Verbal report received from [facility](name) on Raqule Osborne  being received from [facility] (unit) for routine progression of care Report consisted of patients Situation, Background, Assessment and  
Recommendations(SBAR). Information from the following report(s) SBAR, Kardex, [facility] and Recent Results was reviewed with the receiving nurse. Opportunity for questions and clarification was provided. Assessment completed upon patients arrival to unit and care assumed.

## 2019-01-21 NOTE — DISCHARGE INSTRUCTIONS
Cardiac Catheterization/Angiography Discharge Instructions    *Check the puncture site frequently for swelling or bleeding. If you see any bleeding, lie down and apply pressure over the area with a clean town or washcloth. Notify your doctor for any redness, swelling, drainage or oozing from the puncture site. Notify your doctor for any fever or chills. *If the leg or arm with the puncture becomes cold, numb or painful, call Dr Yazan Chan at  [phone]. *Activity should be limited for the next 48 hours. Climb stairs as little as possible and avoid any stooping, bending or strenuous activity for 48 hours. No heavy lifting (anything over 10 pounds) for three days. *Do not drive for 48 hours. *You may resume your usual diet. Drink more fluids than usual.    *Have a responsible person drive you home and stay with you for at least 24 hours after your heart catheterization/angiography. *You may remove the bandage from your Right and Arm in 24 hours. You may shower in 24 hours. No tub baths, hot tubs or swimming for one week. Do not place any lotions, creams, powders, ointments over the puncture site for one week. You may place a clean band-aid over the puncture site each day for 5 days. Change this daily. DASH Diet: Care Instructions  Your Care Instructions    The DASH diet is an eating plan that can help lower your blood pressure. DASH stands for Dietary Approaches to Stop Hypertension. Hypertension is high blood pressure. The DASH diet focuses on eating foods that are high in calcium, potassium, and magnesium. These nutrients can lower blood pressure. The foods that are highest in these nutrients are fruits, vegetables, low-fat dairy products, nuts, seeds, and legumes. But taking calcium, potassium, and magnesium supplements instead of eating foods that are high in those nutrients does not have the same effect. The DASH diet also includes whole grains, fish, and poultry.   The DASH diet is one of several lifestyle changes your doctor may recommend to lower your high blood pressure. Your doctor may also want you to decrease the amount of sodium in your diet. Lowering sodium while following the DASH diet can lower blood pressure even further than just the DASH diet alone. Follow-up care is a key part of your treatment and safety. Be sure to make and go to all appointments, and call your doctor if you are having problems. It's also a good idea to know your test results and keep a list of the medicines you take. How can you care for yourself at home? Following the DASH diet  · Eat 4 to 5 servings of fruit each day. A serving is 1 medium-sized piece of fruit, ½ cup chopped or canned fruit, 1/4 cup dried fruit, or 4 ounces (½ cup) of fruit juice. Choose fruit more often than fruit juice. · Eat 4 to 5 servings of vegetables each day. A serving is 1 cup of lettuce or raw leafy vegetables, ½ cup of chopped or cooked vegetables, or 4 ounces (½ cup) of vegetable juice. Choose vegetables more often than vegetable juice. · Get 2 to 3 servings of low-fat and fat-free dairy each day. A serving is 8 ounces of milk, 1 cup of yogurt, or 1 ½ ounces of cheese. · Eat 6 to 8 servings of grains each day. A serving is 1 slice of bread, 1 ounce of dry cereal, or ½ cup of cooked rice, pasta, or cooked cereal. Try to choose whole-grain products as much as possible. · Limit lean meat, poultry, and fish to 2 servings each day. A serving is 3 ounces, about the size of a deck of cards. · Eat 4 to 5 servings of nuts, seeds, and legumes (cooked dried beans, lentils, and split peas) each week. A serving is 1/3 cup of nuts, 2 tablespoons of seeds, or ½ cup of cooked beans or peas. · Limit fats and oils to 2 to 3 servings each day. A serving is 1 teaspoon of vegetable oil or 2 tablespoons of salad dressing. · Limit sweets and added sugars to 5 servings or less a week.  A serving is 1 tablespoon jelly or jam, ½ cup sorbet, or 1 cup of lemonade. · Eat less than 2,300 milligrams (mg) of sodium a day. If you limit your sodium to 1,500 mg a day, you can lower your blood pressure even more. Tips for success  · Start small. Do not try to make dramatic changes to your diet all at once. You might feel that you are missing out on your favorite foods and then be more likely to not follow the plan. Make small changes, and stick with them. Once those changes become habit, add a few more changes. · Try some of the following:  ? Make it a goal to eat a fruit or vegetable at every meal and at snacks. This will make it easy to get the recommended amount of fruits and vegetables each day. ? Try yogurt topped with fruit and nuts for a snack or healthy dessert. ? Add lettuce, tomato, cucumber, and onion to sandwiches. ? Combine a ready-made pizza crust with low-fat mozzarella cheese and lots of vegetable toppings. Try using tomatoes, squash, spinach, broccoli, carrots, cauliflower, and onions. ? Have a variety of cut-up vegetables with a low-fat dip as an appetizer instead of chips and dip. ? Sprinkle sunflower seeds or chopped almonds over salads. Or try adding chopped walnuts or almonds to cooked vegetables. ? Try some vegetarian meals using beans and peas. Add garbanzo or kidney beans to salads. Make burritos and tacos with mashed serna beans or black beans. Where can you learn more? Go to http://arnold-lynsey.info/. Enter X534 in the search box to learn more about \"DASH Diet: Care Instructions. \"  Current as of: July 22, 2018  Content Version: 11.9  © 4200-3683 [employer], [employer]. Care instructions adapted under license by [employer] (which disclaims liability or warranty for this information). If you have questions about a medical condition or this instruction, always ask your healthcare professional. [address] any warranty or liability for your use of this information.        High Blood Pressure: Care Instructions  Overview    It's normal for blood pressure to go up and down throughout the day. But if it stays up, you have high blood pressure. Another name for high blood pressure is hypertension. Despite what a lot of people think, high blood pressure usually doesn't cause headaches or make you feel dizzy or lightheaded. It usually has no symptoms. But it does increase your risk of stroke, heart attack, and other problems. You and your doctor will talk about your risks of these problems based on your blood pressure. Your doctor will give you a goal for your blood pressure. Your goal will be based on your health and your age. Lifestyle changes, such as eating healthy and being active, are always important to help lower blood pressure. You might also take medicine to reach your blood pressure goal.  Follow-up care is a key part of your treatment and safety. Be sure to make and go to all appointments, and call your doctor if you are having problems. It's also a good idea to know your test results and keep a list of the medicines you take. How can you care for yourself at home? Medical treatment  · If you stop taking your medicine, your blood pressure will go back up. You may take one or more types of medicine to lower your blood pressure. Be safe with medicines. Take your medicine exactly as prescribed. Call your doctor if you think you are having a problem with your medicine. · Talk to your doctor before you start taking aspirin every day. Aspirin can help certain people lower their risk of a heart attack or stroke. But taking aspirin isn't right for everyone, because it can cause serious bleeding. · See your doctor regularly. You may need to see the doctor more often at first or until your blood pressure comes down. · If you are taking blood pressure medicine, talk to your doctor before you take decongestants or anti-inflammatory medicine, such as ibuprofen.  Some of these medicines can raise blood pressure. · Learn how to check your blood pressure at home. Lifestyle changes  · Stay at a healthy weight. This is especially important if you put on weight around the waist. Losing even 10 pounds can help you lower your blood pressure. · If your doctor recommends it, get more exercise. Walking is a good choice. Bit by bit, increase the amount you walk every day. Try for at least 30 minutes on most days of the week. You also may want to swim, bike, or do other activities. · Avoid or limit alcohol. Talk to your doctor about whether you can drink any alcohol. · Try to limit how much sodium you eat to less than 2,300 milligrams (mg) a day. Your doctor may ask you to try to eat less than 1,500 mg a day. · Eat plenty of fruits (such as bananas and oranges), vegetables, legumes, whole grains, and low-fat dairy products. · Lower the amount of saturated fat in your diet. Saturated fat is found in animal products such as milk, cheese, and meat. Limiting these foods may help you lose weight and also lower your risk for heart disease. · Do not smoke. Smoking increases your risk for heart attack and stroke. If you need help quitting, talk to your doctor about stop-smoking programs and medicines. These can increase your chances of quitting for good. When should you call for help? Call 911 anytime you think you may need emergency care. This may mean having symptoms that suggest that your blood pressure is causing a serious heart or blood vessel problem. Your blood pressure may be over 180/120.   For example, call 911 if:    · You have symptoms of a heart attack. These may include:  ? Chest pain or pressure, or a strange feeling in the chest.  ? Sweating. ? Shortness of breath. ? Nausea or vomiting. ? Pain, pressure, or a strange feeling in the back, neck, jaw, or upper belly or in one or both shoulders or arms. ? Lightheadedness or sudden weakness.   ? A fast or irregular heartbeat.     · You have symptoms of a stroke. These may include:  ? Sudden numbness, tingling, weakness, or loss of movement in your face, arm, or leg, especially on only one side of your body. ? Sudden vision changes. ? Sudden trouble speaking. ? Sudden confusion or trouble understanding simple statements. ? Sudden problems with walking or balance. ? A sudden, severe headache that is different from past headaches.     · You have severe back or belly pain.    Do not wait until your blood pressure comes down on its own. Get help right away.   Call your doctor now or seek immediate care if:    · Your blood pressure is much higher than normal (such as 180/120 or higher), but you don't have symptoms.     · You think high blood pressure is causing symptoms, such as:  ? Severe headache.  ? Blurry vision.    Watch closely for changes in your health, and be sure to contact your doctor if:    · Your blood pressure measures higher than your doctor recommends at least 2 times. That means the top number is higher or the bottom number is higher, or both.     · You think you may be having side effects from your blood pressure medicine. Where can you learn more? Go to http://arnold-lynsey.info/. Enter T277 in the search box to learn more about \"High Blood Pressure: Care Instructions. \"  Current as of: July 22, 2018  Content Version: 11.9  © 2848-4613 [employer]. Care instructions adapted under license by [employer] (which disclaims liability or warranty for this information). If you have questions about a medical condition or this instruction, always ask your healthcare professional. [place] [address] any warranty or liability for your use of this information.

## 2019-01-21 NOTE — PROCEDURES
Brief Cardiac Procedure Note    Patient: Army Renteria MRN: [id number]  SSN: [id number]    YOB: 1933  Age: 80 y.o. Sex: female      Date of Procedure: 1/21/2019     Pre-procedure Diagnosis: Atypical Angina    Post-procedure Diagnosis: Non-cardiac Chest Pain    Reason for Procedure: ACS < or = 24 Hours    Procedure: Left Heart Catheterization    Brief Description of Procedure: LHC via R radial artery    Performed By: Chauncey Moreira MD     Assistants: None    Anesthesia: Moderate Sedation    Estimated Blood Loss: Less than 10 mL      Specimens: None    Implants: None    Findings: LM normal, LAD luminal irregs, Circ luminal irregs, RCA luminal irregs, LVEF 55-60%, LVEDP 17 mmHg    Complications: None    Recommendations: Continue medical therapy, okay for D/C.     Signed By: Chauncey Moreira MD     January 21, 2019

## 2019-01-21 NOTE — PROGRESS NOTES
Bedside and Verbal shift change report given to Miguel Ángel Chauhan and Viridiana Marquez RN (oncoming nurse) by Manny Traylor RN (offgoing nurse). Report included the following information SBAR, Kardex, Accordion and Recent Results.

## 2019-01-21 NOTE — ED NOTES
TRANSFER - OUT REPORT: 
 
Verbal report given to Carlotta White Bird. on Tri Wright  being transferred to [facility] for routine progression of care Report consisted of patients Situation, Background, Assessment and  
Recommendations(SBAR). Information from the following report(s) ED Summary was reviewed with the receiving nurse. Opportunity for questions and clarification was provided. Patient transported with: 
 Monitor by [facility]

## 2019-01-21 NOTE — PROGRESS NOTES
TRANSFER - IN REPORT: 
 
Verbal report received from Andrea Elena RN on Joaquina Carey being received from [place] for routine progression of care Report consisted of patients Situation, Background, Assessment and Recommendations(SBAR). Information from the following report(s) SBAR, Kardex and ED Summary was reviewed with the receiving nurse. Opportunity for questions and clarification was provided. Assessment completed upon patients arrival to unit and care assumed.

## 2019-01-21 NOTE — PROGRESS NOTES
Bedside and Verbal shift change report given to Marizol (oncoming nurse) by Nelly (offgoing nurse). Report included the following information SBAR, Kardex, MAR and Recent Results.

## 2019-01-21 NOTE — PROCEDURES
[address] [place] Carvajal  MR#: [id number]  : 08/10/1933  ACCOUNT #: [de-identified]   DATE OF SERVICE: 2019    INDICATIONS:  The patient is an 60-year-old female, who has recently had an emergency room visit where she was ruled out for acute MI and underwent a stress test, that showed transient ischemic dilatation and inferior ischemia. She was referred for heart catheterization; however, had a second episode of chest pain that brought her back into the emergency room, so a heart catheterization was done to evaluate her coronary anatomy today. BLOOD LOSS:  Less than 5 mL. SEDATION:  The patient was given 2 mg of Versed and 50 mcg of fentanyl, and monitored for conscious sedation beginning at 15:18, ending at 15:35, by nurse Minh Miner. SPECIMENS REMOVED:  None. COMPLICATIONS:  None. ASSISTANT:  None. Preprocedure timeout was completed. Mallampati score of II. ASA score of 3. DESCRIPTION OF PROCEDURE:  After informed consent, the patient was prepped and draped in the usual sterile fashion. The right wrist was infiltrated with lidocaine. The right radial artery was accessed via the modified Seldinger technique with a 6-Grenadian sheath. A total of 70 mL of Isovue contrast were used for the entire procedure. A Terumo band was used for hemostasis. CATHETERS USED:  Included a 5-Grenadian JL3.5, a 5-Grenadian JR5, and a 5-Grenadian angled pigtail catheter. FINDINGS:    1. Left ventricular ejection fraction is estimated at 55% to 60% with normal wall motion. 2.  LVEDP was 17 mmHg. 3.  Left main was normal.  4.  Left anterior descending artery had luminal irregularities throughout. 5.  Circumflex artery had luminal irregularities. 6.  The right coronary artery had luminal irregularities. CONCLUSIONS:  This is an 60-year-old female with a false positive cardiac stress test and noncardiac chest pain.   We will likely continue her current medications for coronary artery disease and hypertension, and plan on discharging later today.       MD EFREM Pimentel / SARAH  D: 01/21/2019 15:49     T: 01/21/2019 16:09  JOB #: [id number]

## 2019-01-21 NOTE — PROGRESS NOTES
TRANSFER - OUT REPORT: 
 
Verbal report given to Medardo RN(name) on Loanne Monday  being transferred to [facility](unit) for routine progression of care Report consisted of patients Situation, Background, Assessment and  
Recommendations(SBAR). Information from the following report(s) Procedure Summary, MAR and Cardiac Rhythm SB/SR was reviewed with the receiving nurse. Lines:  
Peripheral IV 01/20/19 Right;Upper Forearm (Active) Site Assessment Clean, dry, & intact 1/21/2019 12:06 PM  
Phlebitis Assessment 0 1/21/2019 12:06 PM  
Infiltration Assessment 0 1/21/2019 12:06 PM  
Dressing Status Clean, dry, & intact 1/21/2019 12:06 PM  
Dressing Type Tape;Transparent 1/21/2019 12:06 PM  
Hub Color/Line Status Patent 1/21/2019 12:06 PM  
Alcohol Cap Used No 1/21/2019 12:06 PM  
   
Peripheral IV 01/21/19 Anterior; Left Forearm (Active) Site Assessment Clean, dry, & intact 1/21/2019 12:06 PM  
Phlebitis Assessment 0 1/21/2019 12:06 PM  
Infiltration Assessment 0 1/21/2019 12:06 PM  
Dressing Status Clean, dry, & intact 1/21/2019 12:06 PM  
Dressing Type Tape;Transparent 1/21/2019 12:06 PM  
Hub Color/Line Status Patent 1/21/2019 12:06 PM  
Alcohol Cap Used No 1/21/2019 12:06 PM  
   
Peripheral IV 01/20/19 Right Antecubital (Active) Site Assessment Clean, dry, & intact 1/21/2019 12:06 PM  
Phlebitis Assessment 0 1/21/2019 12:06 PM  
Infiltration Assessment 0 1/21/2019 12:06 PM  
Dressing Status Clean, dry, & intact 1/21/2019 12:06 PM  
Dressing Type Tape;Transparent 1/21/2019 12:06 PM  
Hub Color/Line Status Patent 1/21/2019 12:06 PM  
Alcohol Cap Used No 1/21/2019 12:06 PM  
  
 
Opportunity for questions and clarification was provided. Patient transported with: 
 Registered Nurse [facility] Dr Juan Ellis Diagnostic only Right radial access - TR band @ 1534 w/ 12 ml in band Versed 2mg Fent 50 mcg

## 2019-01-21 NOTE — PROGRESS NOTES
Bedside and Verbal shift change report given to Nelly (oncoming nurse) by Amanda Adams (offgoing nurse). Report included the following information SBAR, Kardex, MAR and Recent Results.

## 2019-01-21 NOTE — ED TRIAGE NOTES
Pt complains of chest pain/pressure since saba 1900 tonight. Reports taking 3 nitro tablets PTA. Reports scheduled cardiac cath on Thursday [facility].

## 2019-01-21 NOTE — PROGRESS NOTES
Report received from [address] [place] [address]. Procedural findings communicated. Intra procedural  medication administration reviewed. Progression of care discussed. Patient received into [id number] [facility] 2 post sheath removal.  
 
Access site without bleeding or swelling yes Dressing dry and intact yes Patient instructed to limit movement to right upper extremity Routine post procedural vital signs and site assessment initiated yes

## 2019-01-21 NOTE — DISCHARGE SUMMARY
Physician Discharge Summary     Patient ID:  Vin Barillas  [id number]  80 y.o.  8/10/1933    Admit date: 1/21/2019    Discharge date and time: 1/21/19    Admitting Physician: Kari Sweet MD     Primary Cardiologist:Dr. Salomon     Primary Care MD:None    Discharge Physician: Marco A Almonte NP    Admission Diagnoses: Chest Pain  Chest pain    Discharge Diagnoses:   Patient Active Problem List    Diagnosis Date Noted    Chest pain 01/21/2019    Hypertension 01/21/2019    Diabetes ([address] 01/21/2019    Ill-defined condition 01/21/2019    GERD (gastroesophageal reflux disease) 01/21/2019    HLD (hyperlipidemia) 01/21/2019           Hospital Course: HPI:  Vin Barillas is a 80 y.o. AA female with PMHx of HTN, HLD, CAD (R w/ 10% blockage), GERD, Depression/Anxiety (untreated) and DM II who presented to the ED at [facility] with c/o L chest pressure. She reports she didn't sleep well last night. She had some mild chest discomfort and took 2 SL NTG with resolution of discomfort and she went to sleep. States she didn't feel \"peppy\" today. She went to prayer meeting at her [employer] from [address] After this she began to feel the L chest discomfort returning. Located under her L breast. No SOB, palpitations, N/V or diaphoresis. She took 3 SL NTG without any improvement and thus went to the ED for evaluation. Cardiology asked to admit for further work-up and management.      Denies prior known coronary disease. States that she has had \"palpitations\" for years. Usually occur with activity like climbing stairs. States last no more than 5 min. She states that she has never had any CP/pressure before. Reports she has been under a lot of stress recently caring for her ill . She states that has not been managing her BS as well. A1C at [facility] 1/15/19 was 8.2. Reports that BP runs high at times. Reports taking meds as directed. She had a HA last week and elevated BP. She went to  and was then taken to [facility] ED.  She was admitted to observation there from 1/14-1/15 and underwent EKG and NST. She was referred to Cardiology there but instead called and got appt with Dr. Julio Talbert. She was referred for [facility] and this was planned for Thurs 1/24. Non-smoker. States no known family hx of CAD. 2 sisters that have required PPM.      In ED: initial trop (-), Cr 1.14 (up from 0.80 at [facility]),         -------------------------------------------------------------------------------------------------------------------------    Pt was admitted for further evaluation of her chest pain. Cardiac cath was recommended and pursued noting no evidence of obstructive CAD. She will be discharged home on her prior to admit medications. DISPOSITION: The patient is being discharged to home on a low saturated fat, low cholesterol diet. Pt is instructed to abstain from any heavy lifting, straining, stooping or driving for 5 days. Pt is instructed to watch groin site ( if groin access was performed) for bleeding/oozing. If so,pt is instructed to apply firm pressure with clean cloth and call office at [phone]. Pt is instructed to watch for signs of infection which include increasing area of redness around site, fever/hot to touch or purulent drainage. Pt is instructed not to soak in a tub bath for 1 week, but it is okay to shower. Discharge Exam:     Visit Vitals  /63 (BP 1 Location: Left arm, BP Patient Position: At rest)   Pulse 62   Temp 98.6 °F (37 °C)   Resp 16   Ht 5' 6\" (1.676 m)   Wt 79.6 kg (175 lb 8 oz)   SpO2 100%   BMI 28.33 kg/m²     General Appearance:  Well developed, well nourished,alert and oriented x 3, and individual in no acute distress. Ears/Nose/Mouth/Throat:   Hearing grossly normal.         Neck: Supple. Chest:   Lungs clear to auscultation bilaterally. Cardiovascular:  Regular rate and rhythm, S1, S2 normal, no murmur. Abdomen:   Soft, non-tender, bowel sounds are active. Extremities: No edema bilaterally. Skin: Warm and dry. Final Laboratory Data:  Recent Results (from the past 24 hour(s))   EKG, 12 LEAD, INITIAL    Collection Time: 01/20/19  9:31 PM   Result Value Ref Range    Ventricular Rate 74 BPM    Atrial Rate 74 BPM    P-R Interval 224 ms    QRS Duration 82 ms    Q-T Interval 372 ms    QTC Calculation (Bezet) 412 ms    Calculated P Axis 84 degrees    Calculated R Axis 23 degrees    Calculated T Axis 103 degrees    Diagnosis       !! AGE AND GENDER SPECIFIC ECG ANALYSIS !! Sinus rhythm with 1st degree A-V block  Minimal voltage criteria for LVH, may be normal variant  Septal infarct (cited on or before 23-OCT-2017)  Abnormal ECG  When compared with ECG of 23-OCT-2017 14:26,  [place] interval has increased  Confirmed by Yaakov Rowland ([id number]) on 1/21/2019 9:20:41 AM     CBC WITH AUTOMATED DIFF    Collection Time: 01/20/19  9:48 PM   Result Value Ref Range    WBC 7.8 4.3 - 11.1 K/uL    RBC 3.50 (L) 4.05 - 5.2 M/uL    HGB 9.0 (L) 11.7 - 15.4 g/dL    HCT 29.0 (L) 35.8 - 46.3 %    MCV 82.9 79.6 - 97.8 FL    MCH 25.7 (L) 26.1 - 32.9 PG    MCHC 31.0 (L) 31.4 - 35.0 g/dL    RDW 14.3 11.9 - 14.6 %    PLATELET 077 894 - 353 K/uL    MPV 12.2 9.4 - 12.3 FL    ABSOLUTE NRBC 0.00 0.0 - 0.2 K/uL    DF AUTOMATED      NEUTROPHILS 55 43 - 78 %    LYMPHOCYTES 35 13 - 44 %    MONOCYTES 9 4.0 - 12.0 %    EOSINOPHILS 1 0.5 - 7.8 %    BASOPHILS 1 0.0 - 2.0 %    IMMATURE GRANULOCYTES 0 0.0 - 5.0 %    ABS. NEUTROPHILS 4.2 1.7 - 8.2 K/UL    ABS. LYMPHOCYTES 2.7 0.5 - 4.6 K/UL    ABS. MONOCYTES 0.7 0.1 - 1.3 K/UL    ABS. EOSINOPHILS 0.1 0.0 - 0.8 K/UL    ABS. BASOPHILS 0.1 0.0 - 0.2 K/UL    ABS. IMM.  GRANS. 0.0 0.0 - 0.5 K/UL   METABOLIC PANEL, COMPREHENSIVE    Collection Time: 01/20/19  9:48 PM   Result Value Ref Range    Sodium 141 136 - 145 mmol/L    Potassium 4.4 3.5 - 5.1 mmol/L    Chloride 107 98 - 107 mmol/L    CO2 26 21 - 32 mmol/L    Anion gap 8 mmol/L    Glucose 205 (H) 65 - 100 mg/dL    BUN 17 8 - 23 MG/DL Creatinine 1.14 (H) 0.6 - 1.0 MG/DL    GFR est AA 58 (L) >60 ml/min/1.73m2    GFR est non-AA 48 ml/min/1.73m2    Calcium 8.7 8.3 - 10.4 MG/DL    Bilirubin, total 0.1 (L) 0.2 - 1.1 MG/DL    ALT (SGPT) 27 12 - 65 U/L    AST (SGOT) 20 15 - 37 U/L    Alk.  phosphatase 74 50 - 130 U/L    Protein, total 7.3 g/dL    Albumin 3.4 3.2 - 4.6 g/dL    Globulin 3.9 (H) 2.3 - 3.5 g/dL    A-G Ratio 0.9     POC TROPONIN-I    Collection Time: 01/20/19  9:53 PM   Result Value Ref Range    Troponin-I (POC) 0 (L) 0.02 - 8.36 ng/ml   METABOLIC PANEL, BASIC    Collection Time: 01/21/19  2:29 AM   Result Value Ref Range    Sodium 142 136 - 145 mmol/L    Potassium 3.7 3.5 - 5.1 mmol/L    Chloride 108 (H) 98 - 107 mmol/L    CO2 26 21 - 32 mmol/L    Anion gap 8 7 - 16 mmol/L    Glucose 106 (H) 65 - 100 mg/dL    BUN 16 8 - 23 MG/DL    Creatinine 0.89 0.6 - 1.0 MG/DL    GFR est AA >60 >60 ml/min/1.73m2    GFR est non-AA >60 >60 ml/min/1.73m2    Calcium 8.3 8.3 - 10.4 MG/DL   CBC W/O DIFF    Collection Time: 01/21/19  2:29 AM   Result Value Ref Range    WBC 7.3 4.3 - 11.1 K/uL    RBC 3.40 (L) 4.05 - 5.2 M/uL    HGB 8.6 (L) 11.7 - 15.4 g/dL    HCT 28.4 (L) 35.8 - 46.3 %    MCV 83.5 79.6 - 97.8 FL    MCH 25.3 (L) 26.1 - 32.9 PG    MCHC 30.3 (L) 31.4 - 35.0 g/dL    RDW 14.3 11.9 - 14.6 %    PLATELET 996 549 - 159 K/uL    MPV 12.8 (H) 9.4 - 12.3 FL    ABSOLUTE NRBC 0.00 0.0 - 0.2 K/uL   MAGNESIUM    Collection Time: 01/21/19  2:29 AM   Result Value Ref Range    Magnesium 1.6 (L) 1.8 - 2.4 mg/dL   LIPID PANEL    Collection Time: 01/21/19  2:29 AM   Result Value Ref Range    LIPID PROFILE          Cholesterol, total 132 <200 MG/DL    Triglyceride 48 35 - 150 MG/DL    HDL Cholesterol 74 (H) 40 - 60 MG/DL    LDL, calculated 48.4 <100 MG/DL    VLDL, calculated 9.6 6.0 - 23.0 MG/DL    CHOL/HDL Ratio 1.8     TROPONIN I    Collection Time: 01/21/19  2:29 AM   Result Value Ref Range    Troponin-I, Qt. 0.02 0.02 - 0.05 NG/ML   TSH 3RD GENERATION    Collection Time: 01/21/19  2:29 AM   Result Value Ref Range    TSH 0.842 0.358 - 3.740 uIU/mL   GLUCOSE, POC    Collection Time: 01/21/19  5:50 AM   Result Value Ref Range    Glucose (POC) 94 65 - 100 mg/dL   TROPONIN I    Collection Time: 01/21/19  8:30 AM   Result Value Ref Range    Troponin-I, Qt. 0.02 0.02 - 0.05 NG/ML   GLUCOSE, POC    Collection Time: 01/21/19 10:54 AM   Result Value Ref Range    Glucose (POC) 122 (H) 65 - 100 mg/dL   TROPONIN I    Collection Time: 01/21/19  2:24 PM   Result Value Ref Range    Troponin-I, Qt. 0.02 0.02 - 0.05 NG/ML       Disposition: home    Patient Instructions:   Current Discharge Medication List      CONTINUE these medications which have NOT CHANGED    Details   telmisartan (MICARDIS) 80 mg tablet Take 80 mg by mouth daily. acetaminophen (TYLENOL EXTRA STRENGTH) 500 mg tablet Take  by mouth every six (6) hours as needed for Pain.      metoprolol succinate (TOPROL XL) 25 mg XL tablet Take 1 Tab by mouth daily. Qty: 30 Tab, Refills: 11    Associated Diagnoses: Coronary artery disease involving native coronary artery of native heart with unstable angina pectoris (HCC)      isosorbide mononitrate ER (IMDUR) 30 mg tablet Take 1 Tab by mouth daily. Qty: 30 Tab, Refills: 11    Associated Diagnoses: Coronary artery disease involving native coronary artery of native heart with unstable angina pectoris (HCC)      latanoprost (XALATAN) 0.005 % ophthalmic solution Administer 1 Drop to both eyes nightly. amLODIPine (NORVASC) 10 mg tablet Take  by mouth daily. cholecalciferol, vitamin D3, (VITAMIN D3) 2,000 unit tab Take  by mouth. Magnesium Oxide 500 mg cap Take 400 mg by mouth. indapamide (LOZOL) 1.25 mg tablet Take  by mouth daily. atorvastatin (LIPITOR) 40 mg tablet Take 1 Tab by mouth daily. Qty: 30 Tab, Refills: 0      Liraglutide (VICTOZA) 0.6 mg/0.1 mL (18 mg/3 mL) sub-q pen 0.6 mg by SubCUTAneous route.       insulin glargine (LANTUS SOLOSTAR) 100 unit/mL (3 mL) pen by SubCUTAneous route. metFORMIN (GLUCOPHAGE) 1,000 mg tablet Take 1,000 mg by mouth two (2) times daily (with meals). omeprazole (PRILOSEC) 20 mg capsule Take 20 mg by mouth daily. aspirin delayed-release 81 mg tablet Take 81 mg by mouth daily. cinnamon bark 500 mg cap Take 1,000 mg by mouth.      pilocarpine (PILOCAR) 4 % ophthalmic solution Administer 1 Drop to both eyes four (4) times daily. brinzolamide (AZOPT) 1 % ophthalmic suspension Administer 1 Drop to both eyes three (3) times daily. brimonidine-timolol (COMBIGAN) 0.2-0.5 % drop ophthalmic solution Administer 1 Drop to both eyes every twelve (12) hours. Referenced discharge instructions provided by nursing for diet and activity. Follow-up:  Primary Cardiologist Dr. Fernando Rojas in two weeks. PCP: (None) in about 4 weeks.     Signed:  Adan Castrejon NP  1/21/2019  3:48 PM

## 2019-01-21 NOTE — H&P
[place] Cardiology History & Physical  
  
Date of  Admission: 1/21/2019 12:32 AM  
 
Primary Care Physician: MD in [place] Primary Cardiologist: Dr. Ino Paul Admitting Physician: Dr. Sree Pang CC: CP 
 
HPI:  Talita Green is a 80 y.o. AA female with PMHx of HTN, HLD, JOSE RAMON (R w/ 10% blockage), GERD, Depression/Anxiety (untreated) and DM II who presented to the ED at [facility] with c/o L chest pressure. She reports she didn't sleep well last night. She had some mild chest discomfort and took 2 SL NTG with resolution of discomfort and she went to sleep. States she didn't feel \"peppy\" today. She went to prayer meeting at her [employer] from [address] After this she began to feel the L chest discomfort returning. Located under her L breast. No SOB, palpitations, N/V or diaphoresis. She took 3 SL NTG without any improvement and thus went to the ED for evaluation. Cardiology asked to admit for further work-up and management. Denies prior known coronary disease. States that she has had \"palpitations\" for years. Usually occur with activity like climbing stairs. States last no more than 5 min. She states that she has never had any CP/pressure before. Reports she has been under a lot of stress recently caring for her ill . She states that has not been managing her BS as well. A1C at [facility] 1/15/19 was 8.2. Reports that BP runs high at times. Reports taking meds as directed. She had a HA last week and elevated BP. She went to  and was then taken to [facility] ED. She was admitted to observation there from 1/14-1/15 and underwent EKG and NST. She was referred to Cardiology there but instead called and got appt with Dr. Ino Paul. She was referred for [facility] and this was planned for Thurs 1/24. Non-smoker. States no known family hx of CAD. 2 sisters that have required PPM.  
 
In ED: initial trop (-), Cr 1.14 (up from 0.80 at [facility]),  Past Medical History:  
Diagnosis Date  Asthma  Diabetes ([address]  Hypertension  Ill-defined condition   
 blockage in carotid artery  Psychiatric disorder   
 depression Past Surgical History:  
Procedure Laterality Date  HX GYN    
 D & C No Known Allergies Social History Socioeconomic History  Marital status: UNKNOWN Spouse name: Not on file  Number of children: Not on file  Years of education: Not on file  Highest education level: Not on file Social Needs  Financial resource strain: Not on file  Food insecurity - worry: Not on file  Food insecurity - inability: Not on file  Transportation needs - medical: Not on file  Transportation needs - non-medical: Not on file Occupational History  Not on file Tobacco Use  Smoking status: Never Smoker  Smokeless tobacco: Never Used Substance and Sexual Activity  Alcohol use: Yes Comment: extremely rare  Drug use: No  
 Sexual activity: No  
Other Topics Concern  Not on file Social History Narrative  Not on file Family History Problem Relation Age of Onset  Diabetes Mother  Hypertension Mother No current facility-administered medications for this encounter. Review of Systems Review of Systems Constitution: Positive for weakness. Negative for chills, diaphoresis, fever, weight gain and weight loss. HENT: Negative. Eyes: Negative. Cardiovascular: Positive for chest pain and palpitations. Negative for irregular heartbeat, leg swelling, near-syncope, orthopnea, paroxysmal nocturnal dyspnea and syncope. Respiratory: Negative. Negative for cough, shortness of breath, sputum production and wheezing. Endocrine: Negative. Hematologic/Lymphatic: Negative. Skin: Negative. Musculoskeletal: Negative. Gastrointestinal: Negative for abdominal pain, nausea and vomiting. Genitourinary: Negative. Neurological: Negative for dizziness, light-headedness, numbness and seizures. Psychiatric/Behavioral: The patient is nervous/anxious. Subjective:  
 
Visit Vitals /65 (BP 1 Location: Left arm, BP Patient Position: At rest) Pulse 71 Temp 97.6 °F (36.4 °C) Resp 18 Ht 5' 6\" (1.676 m) Wt 79.6 kg (175 lb 8 oz) SpO2 99% BMI 28.33 kg/m² Physical Exam  
Constitutional: She is oriented to person, place, and time and well-developed, well-nourished, and in no distress. HENT:  
Head: Normocephalic and atraumatic. Nose: Nose normal.  
Mouth/Throat: Oropharynx is clear and moist.  
Eyes: Conjunctivae and EOM are normal. Pupils are equal, round, and reactive to light. No scleral icterus. Neck: Normal range of motion. Neck supple. No JVD present. No tracheal deviation present. Cardiovascular: Normal rate, regular rhythm and intact distal pulses. Exam reveals no friction rub. Murmur heard. Pulmonary/Chest: Effort normal and breath sounds normal. No stridor. No respiratory distress. She has no wheezes. She has no rales. Abdominal: Soft. Bowel sounds are normal. She exhibits no distension. There is no tenderness. Musculoskeletal: Normal range of motion. She exhibits no edema. Neurological: She is alert and oriented to person, place, and time. No cranial nerve deficit. Skin: Skin is warm and dry. Psychiatric: Mood, memory, affect and judgment normal.  
Appears anxious Cardiographics Telemetry: SR 70s ECG: SR with 1st degree AVB, no acute ST changes Echocardiogram: ordered and pending NST: 1/15/19 Result Narrative · Protocol: Pharmacologic using regadenoson · Resting ECG: SR HR 65 · Stress ECG: unchanged from baseline. SR HR 96 
· LV Function: EF is 71%. LV function is hyperdynamic. The wall motion is  
normal. 
· Left ventricular perfusion: abnormal. 
· Defect 1: Increased RV uptake was present.  Small to moderate defect of  
moderate severity present in the mid inferior, mid inferolateral, apical  
 inferior, apical lateral and apex location. The defect is partially  
reversible. · Findings consistent with inferior and lateral ischemia with a TID of  
1.27 suggesting mulitvessel disease. Labs:  
Recent Results (from the past 24 hour(s)) CBC WITH AUTOMATED DIFF Collection Time: 01/20/19  9:48 PM  
Result Value Ref Range WBC 7.8 4.3 - 11.1 K/uL  
 RBC 3.50 (L) 4.05 - 5.2 M/uL HGB 9.0 (L) 11.7 - 15.4 g/dL HCT 29.0 (L) 35.8 - 46.3 % MCV 82.9 79.6 - 97.8 FL  
 MCH 25.7 (L) 26.1 - 32.9 PG  
 MCHC 31.0 (L) 31.4 - 35.0 g/dL  
 RDW 14.3 11.9 - 14.6 % PLATELET 352 425 - 146 K/uL MPV 12.2 9.4 - 12.3 FL ABSOLUTE NRBC 0.00 0.0 - 0.2 K/uL  
 DF AUTOMATED NEUTROPHILS 55 43 - 78 % LYMPHOCYTES 35 13 - 44 % MONOCYTES 9 4.0 - 12.0 % EOSINOPHILS 1 0.5 - 7.8 % BASOPHILS 1 0.0 - 2.0 % IMMATURE GRANULOCYTES 0 0.0 - 5.0 %  
 ABS. NEUTROPHILS 4.2 1.7 - 8.2 K/UL  
 ABS. LYMPHOCYTES 2.7 0.5 - 4.6 K/UL  
 ABS. MONOCYTES 0.7 0.1 - 1.3 K/UL  
 ABS. EOSINOPHILS 0.1 0.0 - 0.8 K/UL  
 ABS. BASOPHILS 0.1 0.0 - 0.2 K/UL  
 ABS. IMM. GRANS. 0.0 0.0 - 0.5 K/UL METABOLIC PANEL, COMPREHENSIVE Collection Time: 01/20/19  9:48 PM  
Result Value Ref Range Sodium 141 136 - 145 mmol/L Potassium 4.4 3.5 - 5.1 mmol/L Chloride 107 98 - 107 mmol/L  
 CO2 26 21 - 32 mmol/L Anion gap 8 mmol/L Glucose 205 (H) 65 - 100 mg/dL BUN 17 8 - 23 MG/DL Creatinine 1.14 (H) 0.6 - 1.0 MG/DL  
 GFR est AA 58 (L) >60 ml/min/1.73m2 GFR est non-AA 48 ml/min/1.73m2 Calcium 8.7 8.3 - 10.4 MG/DL Bilirubin, total 0.1 (L) 0.2 - 1.1 MG/DL  
 ALT (SGPT) 27 12 - 65 U/L  
 AST (SGOT) 20 15 - 37 U/L Alk. phosphatase 74 50 - 130 U/L Protein, total 7.3 g/dL Albumin 3.4 3.2 - 4.6 g/dL Globulin 3.9 (H) 2.3 - 3.5 g/dL A-G Ratio 0.9 POC TROPONIN-I Collection Time: 01/20/19  9:53 PM  
Result Value Ref Range Troponin-I (POC) 0 (L) 0.02 - 0.05 ng/ml Patient has been seen and examined by Dr. Adam Poe and he agrees with the following assessment and plan: 
 
 Assessment/Plan:  
  
 Principal Problem: 
  Chest pain -- admit to tele, cont ASA, NTG, BB, ARB and statin. Check lipids, check ECHO. NPO with IVF for poss [facility] today as this is her 2nd ER visit in the past week with CP Active Problems: Hypertension -- cont home meds, adjust as indicated Diabetes -- not well controlled, A1C at [facility] on 1/15/19 was 8.2, cont home insulin, hold metformin with [facility], SSI PRN Ill-defined condition (blockage in R carotid artery) -- recently evaluated by her PCP in [place] and she states blockage is 10% and has been stable GERD (gastroesophageal reflux disease) -- cont PPI 
 
  HLD (hyperlipidemia) -- cont statin, check lipids Martell Voss NP 
1/21/2019 1:34 AM

## 2019-01-22 NOTE — PROGRESS NOTES
Radial compression band removed at 1930 after slowly reducing air from 10 cc to zero as per hospital protocol. No bleeding or hematoma noted. 2 x 2 gauze with tegaderm placed over puncture site. The affected extremity is warm and dry to the touch. Frequent vital signs documented per flowsheet. Patient instructed to call if any bleeding noted on gauze. Patient verbalized understanding the nursing instructions.

## 2019-01-22 NOTE — PROGRESS NOTES
Discharge instructions reviewed with patient and son. Prescriptions given and med info sheets provided for all new medications. Opportunity for questions provided. Patient and family voiced understanding of all discharge instructions. Bilateral IV's and heart monitor removed.